# Patient Record
Sex: MALE | Race: WHITE | URBAN - METROPOLITAN AREA
[De-identification: names, ages, dates, MRNs, and addresses within clinical notes are randomized per-mention and may not be internally consistent; named-entity substitution may affect disease eponyms.]

---

## 2017-12-20 ENCOUNTER — OFFICE VISIT (OUTPATIENT)
Dept: FAMILY MEDICINE CLINIC | Facility: CLINIC | Age: 82
End: 2017-12-20

## 2017-12-20 VITALS
HEART RATE: 78 BPM | TEMPERATURE: 98 F | SYSTOLIC BLOOD PRESSURE: 138 MMHG | OXYGEN SATURATION: 98 % | DIASTOLIC BLOOD PRESSURE: 72 MMHG | RESPIRATION RATE: 20 BRPM
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
## 2017-12-20 PROCEDURE — 99202 OFFICE O/P NEW SF 15 MIN: CPT | Performed by: NURSE PRACTITIONER

## 2017-12-20 RX ORDER — DILTIAZEM HYDROCHLORIDE 180 MG/1
180 CAPSULE, COATED, EXTENDED RELEASE ORAL DAILY
COMMUNITY
Start: 2017-11-19

## 2017-12-20 RX ORDER — TAMSULOSIN HYDROCHLORIDE 0.4 MG/1
0.4 CAPSULE ORAL DAILY
COMMUNITY
Start: 2017-11-20

## 2017-12-20 RX ORDER — WARFARIN SODIUM 5 MG/1
7 TABLET ORAL DAILY
COMMUNITY
Start: 2017-11-17

## 2017-12-20 RX ORDER — DIGOXIN 125 MCG
125 TABLET ORAL DAILY
COMMUNITY
Start: 2017-11-26

## 2017-12-20 RX ORDER — METOPROLOL SUCCINATE 100 MG/1
100 TABLET, EXTENDED RELEASE ORAL DAILY
COMMUNITY
Start: 2017-11-20

## 2017-12-20 RX ORDER — IRBESARTAN 150 MG/1
150 TABLET ORAL DAILY
COMMUNITY
Start: 2017-11-26

## 2017-12-20 NOTE — PATIENT INSTRUCTIONS
Bedbugs    After years of being very rare in the [address] [place] [address],[address], bedbugs are making a comeback. These bugs are small, about the size of an apple seed. They are reddish-brown, oval, and look slightly flattened.  Bedbugs feed on human and animal blood, usually at night Bedbugs look for food at night. They bite while people or animals are sleeping. The bites are most often painless. Many people never know they’ve been bitten. But some people develop an itchy red welt or swelling.  And if a person has an allergic reaction, · When traveling, remove linens from the top of the bed and check the mattress and headboard for signs of the bugs. Place luggage on a hard surface such as a table or on a luggage rack and not on the floor.   · If you think you were exposed to bedbugs while

## 2017-12-20 NOTE — PROGRESS NOTES
CHIEF COMPLAINT:   No chief complaint on file. HPI:   Demetrius Hansen is a 80year old male who presents for evaluation of a rash. Per patient rash started in the past 2 weeks. Rash has been progressing since onset.   Patient has not had similar ra LUNGS: Clear to auscultation bilaterally. No wheezing, rhonchi, or rales. No diminished breath sounds. No increased work of breathing.    CARDIO: RRR without murmur      ASSESSMENT AND PLAN:   Glade Bloch is a 80year old male who presents for evaluat · Bedbugs are very hard to get rid of. If an infestation is suspected, it is recommended that a professional  be called. Signs of bedbugs  Bites can be the first sign of a bedbug infestation.  When inspecting for the bugs, look in crevices of m · Signs of infection of the bites, such as increased swelling and pain, warmth, or oozing  · Signs of allergic reaction, such as hives, spreading rash, throat itching or swelling, or wheezing   Avoiding bedbugs  · Avoid buying used beds.  But if you do buy